# Patient Record
Sex: FEMALE | Race: WHITE | Employment: OTHER | URBAN - NONMETROPOLITAN AREA
[De-identification: names, ages, dates, MRNs, and addresses within clinical notes are randomized per-mention and may not be internally consistent; named-entity substitution may affect disease eponyms.]

---

## 2024-08-11 ENCOUNTER — OFFICE VISIT (OUTPATIENT)
Dept: URGENT CARE | Facility: CLINIC | Age: 59
End: 2024-08-11
Payer: COMMERCIAL

## 2024-08-11 VITALS
TEMPERATURE: 97.8 F | RESPIRATION RATE: 18 BRPM | OXYGEN SATURATION: 100 % | HEART RATE: 74 BPM | SYSTOLIC BLOOD PRESSURE: 145 MMHG | DIASTOLIC BLOOD PRESSURE: 69 MMHG

## 2024-08-11 DIAGNOSIS — B02.9 HERPES ZOSTER WITHOUT COMPLICATION: Primary | ICD-10-CM

## 2024-08-11 PROCEDURE — 99213 OFFICE O/P EST LOW 20 MIN: CPT

## 2024-08-11 RX ORDER — ATORVASTATIN CALCIUM 40 MG/1
40 TABLET, FILM COATED ORAL DAILY
COMMUNITY

## 2024-08-11 RX ORDER — GABAPENTIN 300 MG/1
300 CAPSULE ORAL 3 TIMES DAILY
COMMUNITY

## 2024-08-11 RX ORDER — FAMCICLOVIR 500 MG/1
500 TABLET ORAL 3 TIMES DAILY
Qty: 21 TABLET | Refills: 0 | Status: SHIPPED | OUTPATIENT
Start: 2024-08-11 | End: 2024-08-18

## 2024-08-11 RX ORDER — DULOXETIN HYDROCHLORIDE 30 MG/1
30 CAPSULE, DELAYED RELEASE ORAL DAILY
COMMUNITY

## 2024-08-11 RX ORDER — TRAZODONE HYDROCHLORIDE 50 MG/1
50 TABLET, FILM COATED ORAL
COMMUNITY

## 2024-08-11 NOTE — PROGRESS NOTES
[facility]        NAME: Colleen Parker is a 59 y.o. female  : 1965    MRN: [id number]  DATE: 2024  TIME: 12:39 PM    Assessment and Plan   Herpes zoster without complication [B02.9]  1. Herpes zoster without complication  famciclovir (FAMVIR) 500 mg tablet      Will treat for shingles as burning pain consistent with diagnosis, one pinpoint size vesicle present   Discussed follow up with pcp  Patient is on gabapentin for pain      Patient Instructions   Take Famciclovir as prescribed (500mg q8 x 7d)  Keep affected area covered to avoid transmission    Take Ibuprofen and use lidocaine patches over the counter  Cool compresses over area  Talk to your PCP about varicella zoster vaccine     Follow up with PCP in 3-5 days.  Proceed to  ER if symptoms worsen.    If tests are performed, our office will contact you with results only if changes need to made to the care plan discussed with you at the visit. You can review your full results on [facility].    Chief Complaint     Chief Complaint   Patient presents with    Rash     Irregularly shaped area of redness at 7 to 8 oclock position of right breast on set 5 days ago          History of Present Illness       Patient present with a one day history of burning and itchy rash on right breast for 5 days. Patient does not know if she had shingles vaccine.         Review of Systems   Review of Systems      Current Medications       Current Outpatient Medications:     amitriptyline (ELAVIL) 25 mg tablet, Take 25 mg by mouth daily at bedtime, Disp: , Rfl:     atorvastatin (LIPITOR) 40 mg tablet, Take 40 mg by mouth daily, Disp: , Rfl:     DULoxetine (CYMBALTA) 30 mg delayed release capsule, Take 30 mg by mouth daily, Disp: , Rfl:     famciclovir (FAMVIR) 500 mg tablet, Take 1 tablet (500 mg total) by mouth 3 (three) times a day for 7 days, Disp: 21 tablet, Rfl: 0    gabapentin (NEURONTIN) 300 mg capsule, Take 300 mg by mouth 3 (three) times a  day, Disp: , Rfl:     metFORMIN (GLUCOPHAGE) 1000 MG tablet, Take 1,000 mg by mouth 2 (two) times a day with meals, Disp: , Rfl:     traZODone (DESYREL) 50 mg tablet, Take 50 mg by mouth daily at bedtime, Disp: , Rfl:     Current Allergies     Allergies as of 08/11/2024    (No Known Allergies)            The following portions of the patient's history were reviewed and updated as appropriate: allergies, current medications, past family history, past medical history, past social history, past surgical history and problem list.     Past Medical History:   Diagnosis Date    Diabetes mellitus (HCC)        Past Surgical History:   Procedure Laterality Date    HYSTERECTOMY         No family history on file.      Medications have been verified.        Objective   /69   Pulse 74   Temp 97.8 °F (36.6 °C)   Resp 18   SpO2 100%        Physical Exam     Physical Exam  Constitutional:       General: She is not in acute distress.     Appearance: Normal appearance. She is normal weight.   HENT:      Head: Normocephalic.   Cardiovascular:      Rate and Rhythm: Normal rate and regular rhythm.      Pulses: Normal pulses.      Heart sounds: Normal heart sounds. No murmur heard.  Pulmonary:      Effort: Pulmonary effort is normal.      Breath sounds: No wheezing, rhonchi or rales.   Musculoskeletal:      Cervical back: Normal range of motion.   Lymphadenopathy:      Cervical: No cervical adenopathy.   Skin:     Comments: See media   Right breast with large erythematic maculopapular area approx 7.5 cm below nipple and areola. One small pinpoint open area could have been ruptured vesicle    Neurological:      Mental Status: She is alert.

## 2024-08-11 NOTE — PATIENT INSTRUCTIONS
Take Famciclovir as prescribed (500mg q8 x 7d)  Keep affected area covered to avoid transmission    Take Ibuprofen and use lidocaine patches over the counter  Cool compresses over area  Talk to your PCP about varicella zoster vaccine     Follow up with PCP in 3-5 days.  Proceed to  ER if symptoms worsen.    If tests are performed, our office will contact you with results only if changes need to made to the care plan discussed with you at the visit. You can review your full results on [facility].